# Patient Record
Sex: FEMALE | Race: OTHER | NOT HISPANIC OR LATINO | URBAN - METROPOLITAN AREA
[De-identification: names, ages, dates, MRNs, and addresses within clinical notes are randomized per-mention and may not be internally consistent; named-entity substitution may affect disease eponyms.]

---

## 2021-09-11 ENCOUNTER — EMERGENCY (EMERGENCY)
Facility: HOSPITAL | Age: 41
LOS: 1 days | Discharge: HOME | End: 2021-09-11
Attending: EMERGENCY MEDICINE
Payer: MEDICAID

## 2021-09-11 VITALS
RESPIRATION RATE: 20 BRPM | DIASTOLIC BLOOD PRESSURE: 100 MMHG | TEMPERATURE: 98 F | HEART RATE: 97 BPM | HEIGHT: 64 IN | SYSTOLIC BLOOD PRESSURE: 160 MMHG | WEIGHT: 136.03 LBS | OXYGEN SATURATION: 99 %

## 2021-09-11 LAB
ALBUMIN SERPL ELPH-MCNC: 3.9 G/DL — SIGNIFICANT CHANGE UP (ref 3.3–5)
ALP SERPL-CCNC: 71 U/L — SIGNIFICANT CHANGE UP (ref 40–120)
ALT FLD-CCNC: 17 U/L — SIGNIFICANT CHANGE UP (ref 10–45)
ANION GAP SERPL CALC-SCNC: 13 MMOL/L — SIGNIFICANT CHANGE UP (ref 5–17)
ANISOCYTOSIS BLD QL: SIGNIFICANT CHANGE UP
APPEARANCE UR: CLEAR — SIGNIFICANT CHANGE UP
APTT BLD: 30.5 SEC — SIGNIFICANT CHANGE UP (ref 27.5–35.5)
AST SERPL-CCNC: 18 U/L — SIGNIFICANT CHANGE UP (ref 10–40)
BACTERIA # UR AUTO: NEGATIVE — SIGNIFICANT CHANGE UP
BASE EXCESS BLDV CALC-SCNC: -1.4 MMOL/L — SIGNIFICANT CHANGE UP (ref -2–2)
BASOPHILS # BLD AUTO: 0 K/UL — SIGNIFICANT CHANGE UP (ref 0–0.2)
BASOPHILS NFR BLD AUTO: 0 % — SIGNIFICANT CHANGE UP (ref 0–2)
BILIRUB SERPL-MCNC: 0.1 MG/DL — LOW (ref 0.2–1.2)
BILIRUB UR-MCNC: NEGATIVE — SIGNIFICANT CHANGE UP
BLD GP AB SCN SERPL QL: NEGATIVE — SIGNIFICANT CHANGE UP
BUN SERPL-MCNC: 8 MG/DL — SIGNIFICANT CHANGE UP (ref 7–23)
CA-I SERPL-SCNC: 1.2 MMOL/L — SIGNIFICANT CHANGE UP (ref 1.15–1.33)
CALCIUM SERPL-MCNC: 8.9 MG/DL — SIGNIFICANT CHANGE UP (ref 8.4–10.5)
CHLORIDE BLDV-SCNC: 106 MMOL/L — SIGNIFICANT CHANGE UP (ref 96–108)
CHLORIDE SERPL-SCNC: 105 MMOL/L — SIGNIFICANT CHANGE UP (ref 96–108)
CO2 BLDV-SCNC: 27 MMOL/L — HIGH (ref 22–26)
CO2 SERPL-SCNC: 20 MMOL/L — LOW (ref 22–31)
COLOR SPEC: COLORLESS — SIGNIFICANT CHANGE UP
CREAT SERPL-MCNC: 0.72 MG/DL — SIGNIFICANT CHANGE UP (ref 0.5–1.3)
DACRYOCYTES BLD QL SMEAR: SLIGHT — SIGNIFICANT CHANGE UP
DIFF PNL FLD: NEGATIVE — SIGNIFICANT CHANGE UP
ELLIPTOCYTES BLD QL SMEAR: SLIGHT — SIGNIFICANT CHANGE UP
EOSINOPHIL # BLD AUTO: 0 K/UL — SIGNIFICANT CHANGE UP (ref 0–0.5)
EOSINOPHIL NFR BLD AUTO: 0 % — SIGNIFICANT CHANGE UP (ref 0–6)
EPI CELLS # UR: 0 /HPF — SIGNIFICANT CHANGE UP
GAS PNL BLDV: 137 MMOL/L — SIGNIFICANT CHANGE UP (ref 136–145)
GAS PNL BLDV: SIGNIFICANT CHANGE UP
GAS PNL BLDV: SIGNIFICANT CHANGE UP
GLUCOSE BLDV-MCNC: 92 MG/DL — SIGNIFICANT CHANGE UP (ref 70–99)
GLUCOSE SERPL-MCNC: 99 MG/DL — SIGNIFICANT CHANGE UP (ref 70–99)
GLUCOSE UR QL: NEGATIVE — SIGNIFICANT CHANGE UP
HCG UR QL: NEGATIVE — SIGNIFICANT CHANGE UP
HCO3 BLDV-SCNC: 25 MMOL/L — SIGNIFICANT CHANGE UP (ref 22–29)
HCT VFR BLD CALC: 34 % — LOW (ref 34.5–45)
HCT VFR BLDA CALC: 32 % — LOW (ref 34.5–46.5)
HGB BLD CALC-MCNC: 10.8 G/DL — LOW (ref 11.7–16.1)
HGB BLD-MCNC: 10 G/DL — LOW (ref 11.5–15.5)
HYPOCHROMIA BLD QL: SLIGHT — SIGNIFICANT CHANGE UP
INR BLD: 1.1 RATIO — SIGNIFICANT CHANGE UP (ref 0.88–1.16)
KETONES UR-MCNC: NEGATIVE — SIGNIFICANT CHANGE UP
LACTATE BLDV-MCNC: 1.5 MMOL/L — SIGNIFICANT CHANGE UP (ref 0.7–2)
LEUKOCYTE ESTERASE UR-ACNC: NEGATIVE — SIGNIFICANT CHANGE UP
LYMPHOCYTES # BLD AUTO: 1.8 K/UL — SIGNIFICANT CHANGE UP (ref 1–3.3)
LYMPHOCYTES # BLD AUTO: 28.7 % — SIGNIFICANT CHANGE UP (ref 13–44)
MANUAL SMEAR VERIFICATION: SIGNIFICANT CHANGE UP
MCHC RBC-ENTMCNC: 18.5 PG — LOW (ref 27–34)
MCHC RBC-ENTMCNC: 29.4 GM/DL — LOW (ref 32–36)
MCV RBC AUTO: 62.8 FL — LOW (ref 80–100)
MICROCYTES BLD QL: SIGNIFICANT CHANGE UP
MONOCYTES # BLD AUTO: 0.87 K/UL — SIGNIFICANT CHANGE UP (ref 0–0.9)
MONOCYTES NFR BLD AUTO: 13.9 % — SIGNIFICANT CHANGE UP (ref 2–14)
NEUTROPHILS # BLD AUTO: 3.59 K/UL — SIGNIFICANT CHANGE UP (ref 1.8–7.4)
NEUTROPHILS NFR BLD AUTO: 57.4 % — SIGNIFICANT CHANGE UP (ref 43–77)
NITRITE UR-MCNC: NEGATIVE — SIGNIFICANT CHANGE UP
OVALOCYTES BLD QL SMEAR: SLIGHT — SIGNIFICANT CHANGE UP
PCO2 BLDV: 50 MMHG — HIGH (ref 39–42)
PH BLDV: 7.31 — LOW (ref 7.32–7.43)
PH UR: 6.5 — SIGNIFICANT CHANGE UP (ref 5–8)
PLAT MORPH BLD: NORMAL — SIGNIFICANT CHANGE UP
PLATELET # BLD AUTO: 326 K/UL — SIGNIFICANT CHANGE UP (ref 150–400)
PO2 BLDV: 25 MMHG — SIGNIFICANT CHANGE UP (ref 25–45)
POIKILOCYTOSIS BLD QL AUTO: SLIGHT — SIGNIFICANT CHANGE UP
POLYCHROMASIA BLD QL SMEAR: SLIGHT — SIGNIFICANT CHANGE UP
POTASSIUM BLDV-SCNC: 4.6 MMOL/L — SIGNIFICANT CHANGE UP (ref 3.5–5.1)
POTASSIUM SERPL-MCNC: 4.2 MMOL/L — SIGNIFICANT CHANGE UP (ref 3.5–5.3)
POTASSIUM SERPL-SCNC: 4.2 MMOL/L — SIGNIFICANT CHANGE UP (ref 3.5–5.3)
PROT SERPL-MCNC: 6.9 G/DL — SIGNIFICANT CHANGE UP (ref 6–8.3)
PROT UR-MCNC: NEGATIVE — SIGNIFICANT CHANGE UP
PROTHROM AB SERPL-ACNC: 13.1 SEC — SIGNIFICANT CHANGE UP (ref 10.6–13.6)
RBC # BLD: 5.41 M/UL — HIGH (ref 3.8–5.2)
RBC # FLD: 17.1 % — HIGH (ref 10.3–14.5)
RBC BLD AUTO: ABNORMAL
RBC CASTS # UR COMP ASSIST: 1 /HPF — SIGNIFICANT CHANGE UP (ref 0–4)
RH IG SCN BLD-IMP: POSITIVE — SIGNIFICANT CHANGE UP
SAO2 % BLDV: 32.1 % — LOW (ref 67–88)
SCHISTOCYTES BLD QL AUTO: SLIGHT — SIGNIFICANT CHANGE UP
SODIUM SERPL-SCNC: 138 MMOL/L — SIGNIFICANT CHANGE UP (ref 135–145)
SP GR SPEC: 1.01 — LOW (ref 1.01–1.02)
TARGETS BLD QL SMEAR: SLIGHT — SIGNIFICANT CHANGE UP
UROBILINOGEN FLD QL: NEGATIVE — SIGNIFICANT CHANGE UP
WBC # BLD: 6.26 K/UL — SIGNIFICANT CHANGE UP (ref 3.8–10.5)
WBC # FLD AUTO: 6.26 K/UL — SIGNIFICANT CHANGE UP (ref 3.8–10.5)
WBC UR QL: 0 /HPF — SIGNIFICANT CHANGE UP (ref 0–5)

## 2021-09-11 PROCEDURE — 99284 EMERGENCY DEPT VISIT MOD MDM: CPT

## 2021-09-11 PROCEDURE — 93975 VASCULAR STUDY: CPT | Mod: 26

## 2021-09-11 PROCEDURE — 76830 TRANSVAGINAL US NON-OB: CPT | Mod: 26

## 2021-09-11 RX ORDER — ONDANSETRON 8 MG/1
4 TABLET, FILM COATED ORAL ONCE
Refills: 0 | Status: COMPLETED | OUTPATIENT
Start: 2021-09-11 | End: 2021-09-11

## 2021-09-11 RX ORDER — SODIUM CHLORIDE 9 MG/ML
500 INJECTION INTRAMUSCULAR; INTRAVENOUS; SUBCUTANEOUS ONCE
Refills: 0 | Status: COMPLETED | OUTPATIENT
Start: 2021-09-11 | End: 2021-09-11

## 2021-09-11 RX ORDER — METOCLOPRAMIDE HCL 10 MG
10 TABLET ORAL ONCE
Refills: 0 | Status: DISCONTINUED | OUTPATIENT
Start: 2021-09-11 | End: 2021-09-11

## 2021-09-11 RX ORDER — MORPHINE SULFATE 50 MG/1
2 CAPSULE, EXTENDED RELEASE ORAL ONCE
Refills: 0 | Status: DISCONTINUED | OUTPATIENT
Start: 2021-09-11 | End: 2021-09-11

## 2021-09-11 RX ORDER — ACETAMINOPHEN 500 MG
975 TABLET ORAL ONCE
Refills: 0 | Status: COMPLETED | OUTPATIENT
Start: 2021-09-11 | End: 2021-09-11

## 2021-09-11 RX ORDER — SODIUM CHLORIDE 9 MG/ML
1000 INJECTION INTRAMUSCULAR; INTRAVENOUS; SUBCUTANEOUS ONCE
Refills: 0 | Status: COMPLETED | OUTPATIENT
Start: 2021-09-11 | End: 2021-09-11

## 2021-09-11 RX ADMIN — SODIUM CHLORIDE 500 MILLILITER(S): 9 INJECTION INTRAMUSCULAR; INTRAVENOUS; SUBCUTANEOUS at 22:00

## 2021-09-11 RX ADMIN — SODIUM CHLORIDE 1000 MILLILITER(S): 9 INJECTION INTRAMUSCULAR; INTRAVENOUS; SUBCUTANEOUS at 19:56

## 2021-09-11 RX ADMIN — ONDANSETRON 4 MILLIGRAM(S): 8 TABLET, FILM COATED ORAL at 19:56

## 2021-09-11 RX ADMIN — Medication 975 MILLIGRAM(S): at 19:56

## 2021-09-11 RX ADMIN — MORPHINE SULFATE 2 MILLIGRAM(S): 50 CAPSULE, EXTENDED RELEASE ORAL at 19:56

## 2021-09-11 NOTE — CONSULT NOTE ADULT - SUBJECTIVE AND OBJECTIVE BOX
GYN Consult Note    HPI:  41y  LMP -9/10 presents with LLQ pain for 3 days that was intermittent in nature and increased to 9/10 in severity today. She didn't take any pain medication for the pain at home. Patient reports headaches, dizziness, N/V x2 associated with the pain over the course of the past 3 days as well. Patient last ate at 1pm with no N/V afterwards. She is s/p embryo transfer  at [facility] with Dr. Mars that failed per patient. She began bleeding on  and has been on a 5 day course     OB/GYN HISTORY:   SAB x2 ,     Last Menstrual Period: , regular menstrual cycles, unsure if she has a LLQ cyst, denies fibroids, STIs    Name of GYN Physician: Dr. Gonzalez ([facility]), Dr. Mars (Fertility)     PAST MEDICAL & SURGICAL HISTORY:  HLD    REVIEW OF SYSTEMS  General: denies fevers, chills, tiredness  Skin/Breast: denies breast pain  Respiratory and Thorax: denies shortness of breath, denies cough  Cardiovascular: denies chest pain and denies palpitations  Gastrointestinal: +abdominal pain, nausea/ vomiting	  Genitourinary: denies dysuria, increased urinary frequency, urgency	  Constitutional, Cardiovascular, Respiratory, Gastrointestinal, Genitourinary, Musculoskeletal and Integumentary review of systems are normal except as noted. 	    MEDICATIONS: s/p Isibloom (-10)    Allergies  No Known Allergies    SOCIAL HISTORY: Denies toxic habits, anxiety and depression       Vital Signs Last 24 Hrs  T(C): 36.7 (11 Sep 2021 17:05), Max: 36.7 (11 Sep 2021 17:05)  T(F): 98.1 (11 Sep 2021 17:05), Max: 98.1 (11 Sep 2021 17:05)  HR: 97 (11 Sep 2021 17:05) (97 - 97)  BP: 160/100 (11 Sep 2021 17:05) (160/100 - 160/100)  BP(mean): --  RR: 20 (11 Sep 2021 17:05) (20 - 20)  SpO2: 99% (11 Sep 2021 17:05) (99% - 99%)    PHYSICAL EXAM:   Gen: NAD, alert and oriented x 3  Cardiovascular: regular   Respiratory: breathing comfortably on RA  Abd: soft, LLQ tender, non-distended  Pelvic: closed/long, no CMT, Uterus: normal size, non tender  Adnexa: LLQ tender, no palpable masses  Extremities: NTBL  Skin: warm and well perfused      LABS:                        10.0   6.26  )-----------( 326      ( 11 Sep 2021 19:52 )             34.0           PT/INR - ( 11 Sep 2021 19:52 )   PT: 13.1 sec;   INR: 1.10 ratio         PTT - ( 11 Sep 2021 19:52 )  PTT:30.5 sec      RADIOLOGY & ADDITIONAL STUDIES: GYN Consult Note    HPI:  41y  LMP -9/10 presents with LLQ pain for 3 days that was intermittent in nature and increased to 9/10 in severity today. She didn't take any pain medication for the pain at home. Patient reports headaches, dizziness, N/V x2 associated with the pain over the course of the past 3 days as well. Patient last ate at 1pm with no N/V afterwards. She is s/p embryo transfer  at [facility] with Dr. Mars that failed per patient. She began bleeding on  and has been on a 5 day course     OB/GYN HISTORY:   SAB x2 ,     Last Menstrual Period: , regular menstrual cycles, unsure if she has a LLQ cyst, denies fibroids, STIs    Name of GYN Physician: Dr. Gonzalez ([facility]), Dr. Mars (Fertility)     PAST MEDICAL & SURGICAL HISTORY:  HLD    REVIEW OF SYSTEMS  General: denies fevers, chills, tiredness  Skin/Breast: denies breast pain  Respiratory and Thorax: denies shortness of breath, denies cough  Cardiovascular: denies chest pain and denies palpitations  Gastrointestinal: +abdominal pain, nausea/ vomiting	  Genitourinary: denies dysuria, increased urinary frequency, urgency	  Constitutional, Cardiovascular, Respiratory, Gastrointestinal, Genitourinary, Musculoskeletal and Integumentary review of systems are normal except as noted. 	    MEDICATIONS: s/p Isibloom (-10)    Allergies  No Known Allergies    SOCIAL HISTORY: Denies toxic habits, anxiety and depression       Vital Signs Last 24 Hrs  T(C): 36.7 (11 Sep 2021 17:05), Max: 36.7 (11 Sep 2021 17:05)  T(F): 98.1 (11 Sep 2021 17:05), Max: 98.1 (11 Sep 2021 17:05)  HR: 97 (11 Sep 2021 17:05) (97 - 97)  BP: 160/100 (11 Sep 2021 17:05) (160/100 - 160/100)  BP(mean): --  RR: 20 (11 Sep 2021 17:05) (20 - 20)  SpO2: 99% (11 Sep 2021 17:05) (99% - 99%)    PHYSICAL EXAM:   Gen: NAD, alert and oriented x 3  Cardiovascular: regular   Respiratory: breathing comfortably on RA  Abd: soft, LLQ tender, non-distended  Pelvic: closed/long, no CMT, Uterus: normal size, non tender  Adnexa: LLQ tender, no palpable masses  Extremities: NTBL  Skin: warm and well perfused      LABS:                        10.0   6.26  )-----------( 326      ( 11 Sep 2021 19:52 )             34.0           PT/INR - ( 11 Sep 2021 19:52 )   PT: 13.1 sec;   INR: 1.10 ratio         PTT - ( 11 Sep 2021 19:52 )  PTT:30.5 sec      RADIOLOGY & ADDITIONAL STUDIES:    < from: US Transvaginal (21 @ 22:47) >  INTERPRETATION:  CLINICAL INFORMATION: Vaginal/pelvic pain    LMP: 2021    COMPARISON: None available.    TECHNIQUE: Transabdominal and transvaginal ultrasound of the pelvis was performed. Grayscale, color Doppler and spectral Doppler were utilized.    FINDINGS:    Uterus: 6.7 cm x 4.0 cm x 5.1 cm. Within normal limits.    Endometrium: 2 mm.    Cervix: Nabothian cyst.    Right ovary: 4.4 cm x 3.0 cm x 3.0 cm. A 3.8 x 2.6 x 3.5 cm avascular hypoechoic structure with internal debris/septation. Arterial/venous flow present along the periphery.    Left ovary: 2.6 cm x 2.7 cm x 2.6 cm. Small follicles. Arterial/venous flow present.    Fluid: None.    IMPRESSION:    Complex right ovarian cyst. Clinical correlation is advised to assess for ovarian torsion as a focal lesion may serve as a lead point. In addition, follow-up pelvic ultrasound is recommendedin 6 weeks to assess resolution.    --- End of Report ---    < end of copied text >

## 2021-09-11 NOTE — CONSULT NOTE ADULT - ATTENDING COMMENTS
Patient seen and examined, agree with above. Pt has been having LLQ pain with vaginal and rectal pressure for past 3 days, now worse today resulting in presentation to ED. Patient reports history of this happening in the past also following her menses, but not as significant as this time. On exam, patient with no tenderness at all at midline abdomen and on right side, has tenderness to palpation in LLQ only, and additionally has left back and low flank pain most notably over her iliac crest with palpation. UA normal, no fevers, WBC normal, TVUS significant for a 3.8 cm cyst on right ovary with normal flow and in proper position on right side, not midline or crossed over to left side. Given patient's location of pain on exam, having had this pain in the past, small size of cyst and normal flow on ultrasound, ovarian torsion is unlikely. Pain could be from a ruptured cyst or midcycle pain, or could be from other etiology like musculoskeletal or GI as distribution around to back over bony pelvis would not be typical for adnexal related pain. Patient's pain also somewhat improved subjectively upon my seeing her. Would continue to treat pain, can try Toradol to see if it helps as well, but as long as pain continues to improve no gyn intervention is needed at this time. Can continue to follow up outpatient.    RBOWN Quinones MD

## 2021-09-11 NOTE — ED PROVIDER NOTE - OBJECTIVE STATEMENT
2 miscarriage, IVF (8/18/21, unsuccessful),   Vaginal bleeding 8/26/21 and took Isibloom 5days ago and stopped bleeding today.  Noticed worsening left pelvic pain with lower back pain and vaginal pressure since 3days ago. c/o headache, dizziness, nausea and fatigue.   OB Dr. Mars 40yo female pt with h/o 2 miscarriage, IVF (8/18/21, unsuccessful), left ovarian cyst presents to ED with left lower abd pain, radiating pain to back with vaginal and rectal pressure for 3days. Pt stated she'd had vaginal bleeding since 8/26/21 and started Isibloom by her gyn Dr. Mars 5days ago and the bleeding stopped bleeding today. She noticed worsening severe left lower abd pain today with headache, dizziness, nausea and fatigue. Denies fever, chills, cough or congestion. Denies CP/SOB. Denies V/D. Denies vaginal discharge. Denies urinary problems.   OB Dr. Mars

## 2021-09-11 NOTE — ED PROVIDER NOTE - ATTENDING CONTRIBUTION TO CARE
RGUJRAL 42yo f hx listed s/p IVF on 8/18 with vaginal bleeding 1 week later started on Isibloom by her gynecologist. Pt complains of LLQ abdominal pain x 3 days worse today 9/10. +Nausea and vomiting x 1 episode. Pt complains of vaginal and rectal pressure, bleeding has subsided. No f/c.   On exam, Patient is awake, alert and oriented x 3.  Patient is well appearing and in mild distress secondary to pain.   NCAT  Neck is supple  Lungs are CTA B/L,+S1S2 no murmurs,  Abdomen:Soft nd/+LLQ abd tenderness +bs no rebound or guarding.  Extremity no edema or calf tender.  Pelvic: L adnexal tenderness. No CMT tenderness or discharge/bleeding.  Skin with no rash.  Check labs, US to eval for torsion. Pain control and re eval.

## 2021-09-11 NOTE — ED PROVIDER NOTE - NSFOLLOWUPINSTRUCTIONS_ED_ALL_ED_FT
- Please follow up with your OBGYN doctor within 72 hours. Bring results from today.     - Acetaminophen up to 650 mg every 6 hours as needed for pain and/or Ibuprofen up to 400 mg every 6 hours as needed for pain.    - Be sure to return to the ED if you develop new, worsening, or any distressing symptoms.

## 2021-09-11 NOTE — ED ADULT NURSE NOTE - OBJECTIVE STATEMENT
42 y/o female PMH two miscarriages, IVF (8/18/21, unsuccessful), L ovarian cyst presents to ED from home c/o L lower abdominal pain that radiates to back. Also c/o vaginal and rectal pressure x 3 days. +vaginal bleeding since 8/26/21 and was started on Isibloom by her GYN 5 days ago and bleeding stopped today. Today has severe LLQ pain with headache, dizziness, nausea. Denying chest pain, SOB, fever, chills, cough, dysuria. Pt is A&O x 3. Breathing even and unlabored. Abdomen is soft, nondistended, tender to palpation by LLQ. Gross motor and neuro intact. 20G IV placed in R forearm by NP Oksana. Safety and comfort provided.

## 2021-09-11 NOTE — CONSULT NOTE ADULT - ASSESSMENT
41y  LMP -9/10 presents with LLQ pain for 3 days that was intermittent in nature and increased to 9/10 in severity today. Physical exam significant for LLQ tenderness and Hct 34 with concern for ovarian torsion vs hemorrhagic cyst vs alternative source for LLQ pain    - f/u TVUS  - pain control prn   - continue to monitor VS  - T&S x2, NPO  - further recs pending work-up     d/w Dr. Joni Heck PGY2       41y  LMP -9/10 presents with LLQ pain for 3 days that was intermittent in nature and increased to 9/10 in severity today. Physical exam significant for LLQ tenderness and Hct 34 with concern for ovarian torsion vs hemorrhagic cyst vs alternative source for LLQ pain    - f/u TVUS  - pain control prn   - continue to monitor VS  - T&S x2, NPO  - further recs pending work-up     d/w Dr. Joni Heck PGY2    Update to note  12:34am  Attempts made to contact Dr. Mars from [facility] to determine collateral information with L&D call-back number provided. US report reviewed revealing 3.8 x 2.6 x 3.5 cm avascular hypoechoic structure with internal debris/septation. Arterial/venous flow present along the periphery. Patient seen at bedside with attending, Dr. Quinones and Glory  #[id number]. Patient states that her pain has improved after morphine to a 8/10 in severity. Upon further history, patient revealed that she has a history of an ovarian cyst seen on sono May 17th "7-8 inches in size." Patient reports that she has a history of vaginal/rectal/LLQ and left lower back pain following her menses that typically improves with Ibuprofen and warm packs, however the pain this month was worse than before so she decided to present to the ED.       Gen NAD  Abd soft, LLQ tenderness, no rebound/guarding     A/P  Imaging and history c/w likely endometrioma and less likely torsion at this time. TVUS reveals flow to b/l ovaries and pain is improving with medication     - f/u with Dr. Mars [facility] to f/u for resolution of cyst  - continue pain medication prn   - no acute GYN intervention   - return to ED precautions provided    seen and d/w Dr. Joni Heck PGY2

## 2021-09-11 NOTE — ED PROVIDER NOTE - PHYSICAL EXAMINATION
NAD, Hypertensive, Afebrile. Anemic conjunctiva. Lungs clear. Diffuse LLQ tender. No peripheral edema. Neuro- intact.

## 2021-09-11 NOTE — ED PROVIDER NOTE - PROGRESS NOTE DETAILS
Spoked to [facility] for urgent US r/o torsion. Pt evaluated by GYN. RGUJRAL Patient is reassessed, states feeling better at this time. US results reviewed. +TTP on LLQ, spoke to OB and will see pt w attending. RGUJCAMELIA Manjinder, PGY3: received s/o pt seen by OB, recs from OB are that they think pt with endometrioma not torsion, pt ok tbdc with outpt f/u if pain is controlled  Pt assessed, states her pain is slightly improved after toradol, will reassess again shortly Manjinder, PGY3: pt feeling better she wants to go home, will dc with OB f/u

## 2021-09-11 NOTE — ED ADULT TRIAGE NOTE - CHIEF COMPLAINT QUOTE
spontaneous vaginal bleeding x 1 month, stopped today. started having a headache 3 days ago with dizziness with intermittent blurry vision.

## 2021-09-11 NOTE — ED PROVIDER NOTE - PATIENT PORTAL LINK FT
You can access the FollowMyHealth Patient Portal offered by  by registering at the following website: [email or web address]. By joining [facility]’s FollowMyHealth portal, you will also be able to view your health information using other applications (apps) compatible with our system.

## 2021-09-12 VITALS
TEMPERATURE: 98 F | RESPIRATION RATE: 18 BRPM | SYSTOLIC BLOOD PRESSURE: 111 MMHG | OXYGEN SATURATION: 98 % | HEART RATE: 61 BPM | DIASTOLIC BLOOD PRESSURE: 64 MMHG

## 2021-09-12 LAB
CULTURE RESULTS: SIGNIFICANT CHANGE UP
SPECIMEN SOURCE: SIGNIFICANT CHANGE UP

## 2021-09-12 PROCEDURE — 86901 BLOOD TYPING SEROLOGIC RH(D): CPT

## 2021-09-12 PROCEDURE — 99284 EMERGENCY DEPT VISIT MOD MDM: CPT | Mod: 25

## 2021-09-12 PROCEDURE — 84295 ASSAY OF SERUM SODIUM: CPT

## 2021-09-12 PROCEDURE — 81025 URINE PREGNANCY TEST: CPT

## 2021-09-12 PROCEDURE — 82803 BLOOD GASES ANY COMBINATION: CPT

## 2021-09-12 PROCEDURE — 85018 HEMOGLOBIN: CPT

## 2021-09-12 PROCEDURE — 85014 HEMATOCRIT: CPT

## 2021-09-12 PROCEDURE — 99284 EMERGENCY DEPT VISIT MOD MDM: CPT

## 2021-09-12 PROCEDURE — 93975 VASCULAR STUDY: CPT

## 2021-09-12 PROCEDURE — 86900 BLOOD TYPING SEROLOGIC ABO: CPT

## 2021-09-12 PROCEDURE — 85025 COMPLETE CBC W/AUTO DIFF WBC: CPT

## 2021-09-12 PROCEDURE — 82330 ASSAY OF CALCIUM: CPT

## 2021-09-12 PROCEDURE — 87086 URINE CULTURE/COLONY COUNT: CPT

## 2021-09-12 PROCEDURE — 85730 THROMBOPLASTIN TIME PARTIAL: CPT

## 2021-09-12 PROCEDURE — 84132 ASSAY OF SERUM POTASSIUM: CPT

## 2021-09-12 PROCEDURE — 80053 COMPREHEN METABOLIC PANEL: CPT

## 2021-09-12 PROCEDURE — 81001 URINALYSIS AUTO W/SCOPE: CPT

## 2021-09-12 PROCEDURE — 76830 TRANSVAGINAL US NON-OB: CPT

## 2021-09-12 PROCEDURE — 83605 ASSAY OF LACTIC ACID: CPT

## 2021-09-12 PROCEDURE — 82947 ASSAY GLUCOSE BLOOD QUANT: CPT

## 2021-09-12 PROCEDURE — 82435 ASSAY OF BLOOD CHLORIDE: CPT

## 2021-09-12 PROCEDURE — 96374 THER/PROPH/DIAG INJ IV PUSH: CPT

## 2021-09-12 PROCEDURE — 96375 TX/PRO/DX INJ NEW DRUG ADDON: CPT

## 2021-09-12 PROCEDURE — 86850 RBC ANTIBODY SCREEN: CPT

## 2021-09-12 PROCEDURE — 85610 PROTHROMBIN TIME: CPT

## 2021-09-12 RX ORDER — ACETAMINOPHEN 500 MG
975 TABLET ORAL ONCE
Refills: 0 | Status: COMPLETED | OUTPATIENT
Start: 2021-09-12 | End: 2021-09-12

## 2021-09-12 RX ORDER — KETOROLAC TROMETHAMINE 30 MG/ML
15 SYRINGE (ML) INJECTION ONCE
Refills: 0 | Status: DISCONTINUED | OUTPATIENT
Start: 2021-09-12 | End: 2021-09-12

## 2021-09-12 RX ADMIN — Medication 975 MILLIGRAM(S): at 03:45

## 2021-09-12 RX ADMIN — Medication 15 MILLIGRAM(S): at 00:42

## 2023-11-02 NOTE — CONSULT NOTE ADULT - CONSULT REQUESTED DATE/TIME
11-Sep-2021 20:25 Detail Level: Zone Additional Notes: Patient advised to do blood work once new insurance starts and to follow up in January to initiate Humira again. Render Risk Assessment In Note?: no

## 2025-09-16 ENCOUNTER — RESULT REVIEW (OUTPATIENT)
Age: 45
End: 2025-09-16

## 2025-09-16 ENCOUNTER — APPOINTMENT (OUTPATIENT)
Dept: SURGICAL ONCOLOGY | Facility: CLINIC | Age: 45
End: 2025-09-16
Payer: MEDICAID

## 2025-09-16 PROCEDURE — 19083 BX BREAST 1ST LESION US IMAG: CPT | Mod: LT

## 2025-09-16 PROCEDURE — 99205 OFFICE O/P NEW HI 60 MIN: CPT | Mod: 25

## 2025-09-22 PROBLEM — Z00.00 ENCOUNTER FOR PREVENTIVE HEALTH EXAMINATION: Status: ACTIVE | Noted: 2025-09-22
